# Patient Record
Sex: MALE | Race: WHITE
[De-identification: names, ages, dates, MRNs, and addresses within clinical notes are randomized per-mention and may not be internally consistent; named-entity substitution may affect disease eponyms.]

---

## 2023-06-19 ENCOUNTER — DASHBOARD ENCOUNTERS (OUTPATIENT)
Age: 78
End: 2023-06-19

## 2023-06-19 ENCOUNTER — LAB OUTSIDE AN ENCOUNTER (OUTPATIENT)
Dept: URBAN - METROPOLITAN AREA CLINIC 72 | Facility: CLINIC | Age: 78
End: 2023-06-19

## 2023-06-19 ENCOUNTER — OFFICE VISIT (OUTPATIENT)
Dept: URBAN - METROPOLITAN AREA CLINIC 72 | Facility: CLINIC | Age: 78
End: 2023-06-19
Payer: MEDICARE

## 2023-06-19 ENCOUNTER — WEB ENCOUNTER (OUTPATIENT)
Dept: URBAN - METROPOLITAN AREA CLINIC 72 | Facility: CLINIC | Age: 78
End: 2023-06-19

## 2023-06-19 VITALS
HEIGHT: 67 IN | DIASTOLIC BLOOD PRESSURE: 62 MMHG | WEIGHT: 183 LBS | BODY MASS INDEX: 28.72 KG/M2 | TEMPERATURE: 97.5 F | HEART RATE: 93 BPM | SYSTOLIC BLOOD PRESSURE: 121 MMHG

## 2023-06-19 DIAGNOSIS — K21.9 CHRONIC GERD: ICD-10-CM

## 2023-06-19 DIAGNOSIS — R10.32 LEFT LOWER QUADRANT ABDOMINAL PAIN: ICD-10-CM

## 2023-06-19 DIAGNOSIS — Z86.010 HISTORY OF COLON POLYPS: ICD-10-CM

## 2023-06-19 DIAGNOSIS — K57.50 DIVERTICULOSIS OF BOTH SMALL AND LARGE INTESTINE WITHOUT PERFORATION OR ABSCESS: ICD-10-CM

## 2023-06-19 PROBLEM — 70342003: Status: ACTIVE | Noted: 2023-06-19

## 2023-06-19 PROBLEM — 397881000: Status: ACTIVE | Noted: 2023-06-19

## 2023-06-19 PROBLEM — 428283002: Status: ACTIVE | Noted: 2023-06-19

## 2023-06-19 PROBLEM — 235595009: Status: ACTIVE | Noted: 2023-06-19

## 2023-06-19 PROBLEM — 1231824009: Status: ACTIVE | Noted: 2023-06-19

## 2023-06-19 PROCEDURE — 99204 OFFICE O/P NEW MOD 45 MIN: CPT | Performed by: NURSE PRACTITIONER

## 2023-06-19 RX ORDER — EMPAGLIFLOZIN 10 MG/1
TABLET, FILM COATED ORAL
Qty: 30 TABLET | Status: ACTIVE | COMMUNITY

## 2023-06-19 RX ORDER — AMITRIPTYLINE HYDROCHLORIDE 50 MG/1
1 TABLET AT BEDTIME TABLET, FILM COATED ORAL ONCE A DAY
Qty: 30 | Status: ACTIVE | COMMUNITY
Start: 2023-06-19

## 2023-06-19 RX ORDER — OMEPRAZOLE 40 MG/1
1 CAPSULE 30 MINUTES BEFORE MORNING MEAL CAPSULE, DELAYED RELEASE ORAL ONCE A DAY
Qty: 30 | Refills: 3 | OUTPATIENT
Start: 2023-06-19

## 2023-06-19 RX ORDER — OMEGA-3S/DHA/EPA/FISH OIL 120-180-60
1 CAPSULE CAPSULE,DELAYED RELEASE (ENTERIC COATED) ORAL ONCE A DAY
Qty: 30 | Status: ACTIVE | COMMUNITY
Start: 2023-06-19 | End: 2023-07-19

## 2023-06-19 RX ORDER — TRIAMTERENE AND HYDROCHLOROTHIAZIDE 37.5; 25 MG/1; MG/1
1 TABLET IN THE MORNING TABLET ORAL ONCE A DAY
Qty: 30 | Status: ACTIVE | COMMUNITY
Start: 2023-06-19

## 2023-06-19 RX ORDER — SIMVASTATIN 40 MG
1 TABLET IN THE EVENING TABLET ORAL ONCE A DAY
Qty: 30 | Status: ACTIVE | COMMUNITY
Start: 2023-06-19

## 2023-06-19 RX ORDER — METFORMIN HYDROCHLORIDE 500 MG/1
1 TABLET WITH A MEAL TABLET, FILM COATED ORAL ONCE A DAY
Qty: 30 | Status: ACTIVE | COMMUNITY
Start: 2023-06-19

## 2023-06-19 RX ORDER — UBIDECARENONE 75 MG
AS DIRECTED CAPSULE ORAL
Status: ACTIVE | COMMUNITY
Start: 2023-06-19

## 2023-06-19 RX ORDER — BENAZEPRIL HYDROCHLORIDE 40 MG/1
1 TABLET TABLET ORAL ONCE A DAY
Qty: 30 | Status: ACTIVE | COMMUNITY
Start: 2023-06-19

## 2023-06-19 RX ORDER — DICYCLOMINE HYDROCHLORIDE 20 MG/1
1 TABLET TABLET ORAL THREE TIMES A DAY
Qty: 90 | Refills: 3 | OUTPATIENT
Start: 2023-06-19 | End: 2023-10-17

## 2023-06-19 RX ORDER — ZINC GLUCONATE 50 MG
1 TABLET TABLET ORAL ONCE A DAY
Qty: 30 | Status: ACTIVE | COMMUNITY
Start: 2023-06-19

## 2023-06-19 NOTE — PHYSICAL EXAM GASTROINTESTINAL
soft, mild LLQ tenderness, no rebound tenderness, , nondistended , normal bowel sounds, no guarding or rigidity

## 2023-06-19 NOTE — HPI-TODAY'S VISIT:
77-year-old male here for ER follow up and was referred by Dr. Resendez for history of colon polyps.   A copy of this note will be sent to the referring provider.  Past medical history of hypertension, type II diabetes mellitus.  Follows [facility] endocrinology Dr. Peterson.  Pacemaker in situ.  Was seen at [facility] emergency department 6/12/2023 for left lower quadrant pain that started 4 days prior to presentation.  He reported walking family dog more and it was felt possible abdominal strain.  Labs and CT did not show any acute abnormalities.  Prescribed Flexeril. Was at [facility] ER yesterday 6/18/23 for left sided abdominal pain, gallstones and NAFLD, diverticulosis.  Was prescribed amitriptyline, dicyclomine 20 mg, omeprazole 40 mg  CT abdomen and pelvis without contrast 6/12/2023 no acute finding.  Punctate nonobstructing bilateral renal calculi.  Hepatic steatosis.  Colonic diverticulosis.  Gallbladder is contracted may contain tiny stone and/or trace sludge. CT abdomen and pelvis with contrast 6/18/2023 revealed small gallstones, moderate fatty infiltration of liver, diverticulosis with no evidence of diverticulitis.  Nonspecific gastric wall thickening felt likely secondary to incomplete distention or gastritis.  Tiny 2 mm nodule left lung base.  Labs 6/12/2023.   glucose trace ketones otherwise normal.  CBC normal with Hgb 15.3, WBC 9, platelet 198.  CMP: BUN 27.2, ALT 53 normal AST.  Glucose 163.  Lipase normal at 73 Labs 6/18/2023.  UA greater than 500 glucose.  CMP revealed glucose 211 otherwise normal.  Magnesium level normal at 2.4.  Lipase normal at 27.  CBC normal with Hgb 15, platelet 236.  On interview today states he is better from yesterday-states yesterday was the worst.  He reports the dicyclomine has helped.  Pain LLQ.  No RUQ or upper abdominal pain.  He denies altered bowel movements, diarrhea, constipation, melena or hematochezia.  No nausea, vomiting, dysphagia.  GERD which was controlled on long-term over-the-counter Nexium.  He was prescribed omeprazole 40 mg by the ER and just started taking it.  No CP, SOB, palpitations, syncope, near-syncope or problems with anesthesia previously.ibed   EGD years ago.  No prior ulcers or Barretts.   Last colonoscopy was 6-7 years ago.  Had polyps

## 2023-06-21 ENCOUNTER — OFFICE VISIT (OUTPATIENT)
Dept: URBAN - METROPOLITAN AREA CLINIC 72 | Facility: CLINIC | Age: 78
End: 2023-06-21

## 2023-07-31 ENCOUNTER — TELEPHONE ENCOUNTER (OUTPATIENT)
Dept: URBAN - METROPOLITAN AREA CLINIC 72 | Facility: CLINIC | Age: 78
End: 2023-07-31

## 2023-09-14 ENCOUNTER — OFFICE VISIT (OUTPATIENT)
Dept: URBAN - METROPOLITAN AREA MEDICAL CENTER 40 | Facility: MEDICAL CENTER | Age: 78
End: 2023-09-14